# Patient Record
Sex: MALE | Race: WHITE
[De-identification: names, ages, dates, MRNs, and addresses within clinical notes are randomized per-mention and may not be internally consistent; named-entity substitution may affect disease eponyms.]

---

## 2019-03-11 ENCOUNTER — HOSPITAL ENCOUNTER (OUTPATIENT)
Dept: HOSPITAL 47 - RADXRMAIN | Age: 69
Discharge: HOME | End: 2019-03-11
Attending: UROLOGY
Payer: MEDICARE

## 2019-03-11 DIAGNOSIS — Z98.890: ICD-10-CM

## 2019-03-11 DIAGNOSIS — N20.0: Primary | ICD-10-CM

## 2019-03-11 PROCEDURE — 74018 RADEX ABDOMEN 1 VIEW: CPT

## 2019-03-11 NOTE — XR
EXAMINATION TYPE: XR KUB

 

DATE OF EXAM: 3/11/2019 6:31 AM

 

CLINICAL HISTORY:  Status post right-sided lithotripsy last week

 

TECHNIQUE:  Two supine KUB images of the abdomen are obtained.

 

COMPARISON: Abdominal x-ray February 11, 2019. CT abdomen and pelvis November 19, 2018

 

FINDINGS: Previously visualized dominant right-sided 8 to 9 mm calculus shows continued interval succ
essful fragmentation with residual 4 mm calculus just below stone filled gallbladder near level of ri
ght 12th rib. Additional tiny bilateral renal calculi on CT are less well seen on plain films.

 

There is redemonstration of dextroconvex scoliotic curvature with multilevel spurring and disc space 
narrowing most prominent at left L3-L4 and right L4-L5 levels. There is overall nonobstructive bowel 
gas pattern.

 

IMPRESSION: Successful interval fragmentation of dominant 8 to 9 mm calculus with residual 4 mm calcu
aaliyah at this level identified.

## 2019-04-01 ENCOUNTER — HOSPITAL ENCOUNTER (OUTPATIENT)
Dept: HOSPITAL 47 - RADXRMAIN | Age: 69
Discharge: HOME | End: 2019-04-01
Attending: UROLOGY
Payer: MEDICARE

## 2019-04-01 DIAGNOSIS — N20.0: Primary | ICD-10-CM

## 2019-04-01 PROCEDURE — 74018 RADEX ABDOMEN 1 VIEW: CPT

## 2019-04-01 NOTE — XR
EXAMINATION TYPE: XR KUB

 

DATE OF EXAM: 4/1/2019

 

COMPARISON: 3/11/2019

 

HISTORY: Renal stone

 

TECHNIQUE: One view abdominal series

 

FINDINGS:  

The osseous structures are intact.  The bowel gas pattern is nonspecific.

 

Gallstones are seen.

 

Scoliotic curvature the spine with degenerative disc disease. Arthropathy of the hips.

 

Stable 4 mm calculus overlying the right upper quadrant likely within the kidney.

 

IMPRESSION:

1. Stable 4 mm right renal calculus.

## 2019-04-02 ENCOUNTER — HOSPITAL ENCOUNTER (OUTPATIENT)
Dept: HOSPITAL 47 - RADCTMAIN | Age: 69
Discharge: HOME | End: 2019-04-02
Attending: UROLOGY
Payer: MEDICARE

## 2019-04-02 DIAGNOSIS — N32.89: ICD-10-CM

## 2019-04-02 DIAGNOSIS — K80.20: Primary | ICD-10-CM

## 2019-04-02 DIAGNOSIS — K44.9: ICD-10-CM

## 2019-04-02 DIAGNOSIS — M79.3: ICD-10-CM

## 2019-04-02 PROCEDURE — 74176 CT ABD & PELVIS W/O CONTRAST: CPT

## 2019-04-02 NOTE — CT
EXAMINATION TYPE: CT abdomen pelvis wo con

 

DATE OF EXAM: 4/2/2019

 

COMPARISON: 11/9/2018

 

HISTORY: 68-year-old male Hx of renal stones, RT side. Lithotripsy 3 weeks ago

 

CT DLP: 884 mGycm. Automated exposure control for dose reduction was used.

 

TECHNIQUE: Contiguous axial scanning of the abdomen and pelvis without IV contrast. Coronal and sagit
raheel reconstructions performed. 

 

FINDINGS: 

Heart is normal size without pericardial effusion. Small hiatal hernia. Lung bases clear without pleu
ral effusion. Borderline ectasia lower descending thoracic aorta at 2.5 cm.

 

Noncontrast appearance of the liver, adrenal glands, spleen with hilar splenule, and pancreas show no
 gross abnormal mobility.

 

Extensive calculi packing the nondistended gallbladder.

 

2 mm nonobstructive left renal calculus and some underlying parapelvic cysts.

 

5 mm right midpole renal calculus, smaller from 7 mm, previously. There is no hydronephrosis on eithe
r side.

 

1.9 cm cortical hypodensity lateral right kidney incompletely characterized on this noncontrast study
, stable for 5 months suggesting a cyst.

 

Mild atherosclerotic calcifications abdominal aorta and iliac arteries.

 

Very subtle left-sided jessica mesentery with some nonenlarged and borderline sized left mesenteric lym
ph nodes measuring up to 6 mm, unchanged.

 

No dilated small bowel, free fluid, or free air.

 

No dilated small bowel, free fluid, or free air. Normal appendix.

 

Mild to moderate scattered stool. No pericolonic inflammatory change.

 

Moderate circumferential bladder wall thickening with prominent urinary bladder distention and trabec
ulated appearance. Prostate gland is enlarged at 5.1 cm wide with 2.5 cm soft tissue protuberance at 
the posterior bladder base. No abnormal fluid collection the pelvis or pelvic lymphadenopathy seen.

 

Bones: Mild degenerative changes at the hips. Degenerative changes SI joints and degenerative disc di
sease and facet arthropathy throughout the mid to lower lumbar spine.

 

 

 

 

IMPRESSION: 

1.  A residual 5 mm right midpole renal calculus, smaller from 7 mm, previously. Punctate 2 mm nonobs
tructive left renal calculus. No hydronephrosis on either side.

2.  A 1.9 cm cortical hypodensity lateral right kidney stable for 5 months, probably a cortical cyst.
 Given the atypical appearance on the patient's 11/13/2018 ultrasound, recommend 6-12 month follow-up
 ultrasound to reassess and ensure continued stability.

3.  Extensive cholelithiasis. Small hiatal hernia.

4.  Stable very subtle jessica mesentery in the left side of the abdomen with some nonenlarged and bord
annabel sized mesenteric lymph nodes measuring up to 6 mm. 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
d.

5.  Marked urinary bladder distention with thickened and trabeculated bladder wall. Along with the pr
ostatomegaly and median lobe hypertrophy, correlate for BPH and chronic bladder outlet obstruction.

## 2023-01-25 ENCOUNTER — HOSPITAL ENCOUNTER (OUTPATIENT)
Dept: HOSPITAL 47 - RADUSWWP | Age: 73
Discharge: HOME | End: 2023-01-25
Attending: FAMILY MEDICINE
Payer: MEDICARE

## 2023-01-25 DIAGNOSIS — R17: ICD-10-CM

## 2023-01-25 DIAGNOSIS — K80.20: Primary | ICD-10-CM

## 2023-01-25 PROCEDURE — 76700 US EXAM ABDOM COMPLETE: CPT

## 2023-01-25 NOTE — US
EXAMINATION TYPE: US abdomen complete

 

DATE OF EXAM: 1/25/2023

 

COMPARISON: CT abdomen and pelvis 4/2/2019, renal ultrasound 11/13/2018.

 

CLINICAL HISTORY: R17.. Hx of kidney stones elevated liver enzymes

 

TECHNIQUE: Multiple sonographic images of the abdomen are obtained.

 

FINDINGS:

 

EXAM MEASUREMENTS:

 

Liver Length:  13.3 cm   

Gallbladder Wall:  .3 cm   

CBD:  .5 cm

Spleen:  Obscured by bowel gas.  

Right Kidney:  9.5 x 4.3 x 3.5  cm 

Left Kidney:  10.2 x 5.9 x 4.5  cm   

 

SONOGRAPHER NOTES:

 

Pancreas:  Obscured by bowel gas

Liver:  wnl  

Gallbladder:  multiple stones visualized. 

**Evidence for sonographic Lara's sign:  No

CBD:  wnl 

Spleen:  Obscured by overlying bowel gas   

Right Kidney:  Area seen on CT scan no well visualized on today's exam.   

Left Kidney:  wnl   

Upper IVC:  wnl  

Abd Aorta:  Proximal obscured by bowel gas. 

 

 

 

The liver is homogenous.  The intrahepatic portion of the IVC is within normal limits. The proximal p
ortion of the aorta is obscured by overlying bowel gas. The mid and distal portions are unremarkable.
 Cholelithiasis is demonstrated. No wall thickening or pericholecystic fluid. Common bile duct is unr
emarkable.  The pancreas is obscured by overlying bowel gas. The spleen is obscured by overlying christiano
l gas.  Kidneys are symmetric and free of hydronephrosis.  No renal lesions are seen.

 

IMPRESSION: 

1. No acute process.

2. Cholelithiasis without evidence for acute cholecystitis.

3. Nonvisualization of the spleen and pancreas due to overlying bowel gas.